# Patient Record
Sex: FEMALE | Race: WHITE | Employment: FULL TIME | URBAN - METROPOLITAN AREA
[De-identification: names, ages, dates, MRNs, and addresses within clinical notes are randomized per-mention and may not be internally consistent; named-entity substitution may affect disease eponyms.]

---

## 2019-08-22 ENCOUNTER — HOSPITAL ENCOUNTER (EMERGENCY)
Facility: CLINIC | Age: 34
Discharge: HOME | End: 2019-08-22
Attending: EMERGENCY MEDICINE | Admitting: EMERGENCY MEDICINE
Payer: COMMERCIAL

## 2019-08-22 VITALS
SYSTOLIC BLOOD PRESSURE: 128 MMHG | DIASTOLIC BLOOD PRESSURE: 76 MMHG | TEMPERATURE: 98.4 F | RESPIRATION RATE: 16 BRPM | HEART RATE: 75 BPM | OXYGEN SATURATION: 99 %

## 2019-08-22 DIAGNOSIS — N76.4 LABIAL ABSCESS: ICD-10-CM

## 2019-08-22 DIAGNOSIS — N76.2 CELLULITIS OF LABIA: ICD-10-CM

## 2019-08-22 PROCEDURE — 87077 CULTURE AEROBIC IDENTIFY: CPT | Performed by: EMERGENCY MEDICINE

## 2019-08-22 PROCEDURE — 25000132 ZZH RX MED GY IP 250 OP 250 PS 637: Performed by: EMERGENCY MEDICINE

## 2019-08-22 PROCEDURE — 99283 EMERGENCY DEPT VISIT LOW MDM: CPT | Mod: 25

## 2019-08-22 PROCEDURE — 87070 CULTURE OTHR SPECIMN AEROBIC: CPT | Performed by: EMERGENCY MEDICINE

## 2019-08-22 PROCEDURE — 87076 CULTURE ANAEROBE IDENT EACH: CPT | Performed by: EMERGENCY MEDICINE

## 2019-08-22 PROCEDURE — 56405 I&D VULVA/PERINEAL ABSCESS: CPT

## 2019-08-22 RX ORDER — LIDOCAINE HYDROCHLORIDE AND EPINEPHRINE 10; 10 MG/ML; UG/ML
INJECTION, SOLUTION INFILTRATION; PERINEURAL
Status: DISCONTINUED
Start: 2019-08-22 | End: 2019-08-22 | Stop reason: HOSPADM

## 2019-08-22 RX ORDER — CEPHALEXIN 500 MG/1
500 CAPSULE ORAL 4 TIMES DAILY
Qty: 28 CAPSULE | Refills: 0 | Status: SHIPPED | OUTPATIENT
Start: 2019-08-22 | End: 2019-08-29

## 2019-08-22 RX ORDER — CEPHALEXIN 500 MG/1
500 CAPSULE ORAL ONCE
Status: COMPLETED | OUTPATIENT
Start: 2019-08-22 | End: 2019-08-22

## 2019-08-22 RX ADMIN — CEPHALEXIN 500 MG: 500 CAPSULE ORAL at 03:12

## 2019-08-22 ASSESSMENT — ENCOUNTER SYMPTOMS
WOUND: 1
VOMITING: 0
FEVER: 0
ROS SKIN COMMENTS: INFLAMMATION

## 2019-08-22 NOTE — RESULT ENCOUNTER NOTE
[facility] ED discharge antibiotic (if prescribed):  Cephalexin (Keflex) 500 mg capsule, 1 capsule (500 mg) by mouth 4 times daily for 7 days.  Incision and Drainage performed in [facility] [Yes / No] : Yes  No changes in treatment per ED lab result protocol.

## 2019-08-22 NOTE — ED AVS SNAPSHOT
[facility] Emergency Department  [address]  [facility] [address]  Phone:  [phone]  Fax:  [phone]                                    Dakota Antonio   MRN: [id number]    Department:  [facility] Emergency Department   Date of Visit:  8/22/2019           After Visit Summary Signature Page    I have received my discharge instructions, and my questions have been answered. I have discussed any challenges I see with this plan with the nurse or doctor.    ..........................................................................................................................................  Patient/Patient Representative Signature      ..........................................................................................................................................  Patient Representative Print Name and Relationship to Patient    ..................................................               ................................................  Date                                   Time    ..........................................................................................................................................  Reviewed by Signature/Title    ...................................................              ..............................................  Date                                               Time          22EPIC Rev 08/18

## 2019-08-22 NOTE — ED PROVIDER NOTES
History     Chief Complaint:    Wound    The history is provided by the patient.      Dakota Antonio is a 34 year old female  who presents with a painful wound to her vaginal labia after having a vaginal delivery three weeks ago. The wound appears inflammed and the pain has worsened over the last two or three days.The patient denies any fever or vomiting.  She did not receive any stitches and is breastfeeding her baby.     Allergies:  No known allergies       Problem List:      There are no active problems to display for this patient.       Past Medical History:    3 weeks post partum     Past Surgical History:    No pertinent PSH     Family History:      No family history on file.    Social History:    Patient accompanied to the emergency department with .  Marital Status:   [2]  Social History     Tobacco Use     Smoking status: Never Smoker   Substance Use Topics     Alcohol use: Yes     Comment: occasional     Drug use: No        Review of Systems   Constitutional: Negative for fever.   Gastrointestinal: Negative for vomiting.   Skin: Positive for wound (vaginal labia, painful).        inflammation   All other systems reviewed and are negative.        Physical Exam     Patient Vitals for the past 24 hrs:   BP Temp Temp src Pulse Heart Rate Resp SpO2   08/22/19 0212 118/75 98.4  F (36.9  C) Oral 73 73 18 99 %     Physical Exam     CV: ppi, regular   Resp: speaking in full sentences without any resp distress  Abd: abdomen is soft without significant tenderness, masses, organomegaly or guarding  : R labial swelling, erythema, bloody/purulent drainage cutaneous surface labia near interface with prox thigh  Ext: peripheral edema present:  No  Skin: warm dry well perfused  Neuro: Alert, no gross motor or sensory deficits,  gait stable    Emergency Department Course         Procedures:   Procedure: Incision and Drainage   LOCATIONS:  R labia majora                 ANESTHESIA:  Local field block using  Lidocaine 1% with epinephrine, total of 7 mLs                 PREPARATION:  Cleansed with Normal Saline                 PROCEDURE:  Area was incised with # 11 Blade (Sharp Point) with a Single Straight incision.  Wound treatment included Deloculation, Purulent Drainage and Expression of Material.  Packing consisted of No Packing.  Appropriate dressing was applied to cover the area.     Patient Status:        Patient tolerated the procedure well. There were no complications.      Interventions:       Medications   lidocaine 1% with EPINEPHrine 1:100,000 1 %-1:094351 injection (has no administration in time range)   lidocaine 1 % (has no administration in time range)   cephALEXin (KEFLEX) capsule 500 mg (500 mg Oral Given 19 031)            Impression & Plan     Medical Decision Makin-year-old female 3 weeks postpartum here with a right labial abscess and associated cellulitis.  Incision and drainage done here in the emergency room given the degree of associated cellulitis put on Keflex here discharge home they are comfortable and agree with that plan.  Wound culture pending.    Critical Care time:  none    Discharge Diagnosis:    ICD-10-CM   1. Labial abscess N76.4   2. Cellulitis of labia N76.2       Disposition:  Discharged to home.    Discharge Medications:  New Prescriptions    CEPHALEXIN (KEFLEX) 500 MG CAPSULE    Take 1 capsule (500 mg) by mouth 4 times daily for 7 days       Scribe Disclosure:  Dainelle RIOS, am serving as a scribe at 3:31 AM on 2019 to document services personally performed by Donald Price MD* based on my observations and the provider's statements to me.     2019  Donald Price, *    Danielle Wyman  2019   [facility] EMERGENCY DEPARTMENT  Scribe Disclosure:  Kamaljit RIOS, am serving as a scribe at 4:02 AM on 2019 to document services personally performed by Donald Price MD based on my observations and the  provider's statements to me.      Donald Price MD  08/22/19 9650

## 2019-08-23 NOTE — RESULT ENCOUNTER NOTE
[place] ED discharge antibiotic (if prescribed):  Cephalexin (Keflex) 500 mg capsule, 1 capsule (500 mg) by mouth 4 times daily for 7 days.  Incision and Drainage performed in [place] ED [Yes / No] : Yes  No changes in treatment per ED lab result protocol.

## 2019-08-25 LAB
BACTERIA SPEC CULT: ABNORMAL
Lab: ABNORMAL
SPECIMEN SOURCE: ABNORMAL